# Patient Record
Sex: MALE | Race: WHITE
[De-identification: names, ages, dates, MRNs, and addresses within clinical notes are randomized per-mention and may not be internally consistent; named-entity substitution may affect disease eponyms.]

---

## 2020-09-24 ENCOUNTER — HOSPITAL ENCOUNTER (OUTPATIENT)
Dept: HOSPITAL 47 - ORWHC2ENDO | Age: 65
Discharge: HOME | End: 2020-09-24
Attending: SURGERY
Payer: MEDICARE

## 2020-09-24 VITALS — TEMPERATURE: 97.9 F

## 2020-09-24 VITALS — SYSTOLIC BLOOD PRESSURE: 134 MMHG | RESPIRATION RATE: 18 BRPM | HEART RATE: 53 BPM | DIASTOLIC BLOOD PRESSURE: 85 MMHG

## 2020-09-24 VITALS — BODY MASS INDEX: 27.7 KG/M2

## 2020-09-24 DIAGNOSIS — Z12.11: Primary | ICD-10-CM

## 2020-09-24 DIAGNOSIS — D17.5: ICD-10-CM

## 2020-09-24 DIAGNOSIS — Z79.899: ICD-10-CM

## 2020-09-24 DIAGNOSIS — K57.30: ICD-10-CM

## 2020-09-24 DIAGNOSIS — E78.5: ICD-10-CM

## 2020-09-24 DIAGNOSIS — N40.0: ICD-10-CM

## 2020-09-24 DIAGNOSIS — I10: ICD-10-CM

## 2020-09-24 DIAGNOSIS — Z98.890: ICD-10-CM

## 2020-09-24 DIAGNOSIS — Z86.010: ICD-10-CM

## 2020-09-24 PROCEDURE — 45385 COLONOSCOPY W/LESION REMOVAL: CPT

## 2020-09-24 PROCEDURE — 88305 TISSUE EXAM BY PATHOLOGIST: CPT

## 2020-09-24 NOTE — P.GSHP
History of Present Illness


H&P Date: 09/24/20


Chief Complaint: History of colon polyp





This a 65-year-old male presents today for colonoscopy.  Patient had a previous 

history of colon polyps.  His last colonoscopy was 6 years ago.





Past Medical History


Past Medical History: Hyperlipidemia, Hypertension, Prostate Disorder


Additional Past Medical History / Comment(s): Hx colon polyp x1.  BPH.


History of Any Multi-Drug Resistant Organisms: None Reported


Past Surgical History: Orthopedic Surgery, Tonsillectomy


Additional Past Surgical History / Comment(s): ORIF Lt foot.  Colonoscopies.  Rt

hand surg for laceration.


Past Anesthesia/Blood Transfusion Reactions: No Reported Reaction


Smoking Status: Never smoker





- Past Family History


  ** Mother


Family Medical History: No Reported History





Medications and Allergies


                                Home Medications











 Medication  Instructions  Recorded  Confirmed  Type


 


Atorvastatin [Lipitor] 20 mg PO DAILY 09/22/20 09/24/20 History


 


Ergocalciferol [Vitamin D2 50,000 unit PO Q7D 09/22/20 09/24/20 History





(DRISDOL)]    


 


Finasteride [Proscar] 5 mg PO DAILY 09/22/20 09/24/20 History


 


lisinopriL [Prinivil] 20 mg PO DAILY 09/22/20 09/24/20 History








                                    Allergies











Allergy/AdvReac Type Severity Reaction Status Date / Time


 


No Known Allergies Allergy   Verified 09/24/20 08:01














Surgical - Exam


                                   Vital Signs











Temp Pulse Resp BP Pulse Ox


 


 97.9 F   56 L  16   143/81   98 


 


 09/24/20 08:04  09/24/20 08:04  09/24/20 08:04  09/24/20 08:04  09/24/20 08:04














- General


well developed, well nourished, no distress





- Eyes


PERRL





- ENT


normal pinna





- Neck


no masses





- Respiratory


normal expansion





- Cardiovascular


Rhythm: regular





- Abdomen


Abdomen: soft, non tender





Assessment and Plan


Assessment: 





History: Polyps.  We'll perform colonoscopy.

## 2020-09-24 NOTE — P.OP
Date of Procedure: 09/24/20


Preoperative Diagnosis: 


History of colon polyp


Postoperative Diagnosis: 


Large peduncular polyp at 60 cm left colon


Procedure(s) Performed: 


Colonoscopy


Anesthesia: MAC


Surgeon: Tristen Woo


Pathology: other (Colon polyp)


Condition: stable


Disposition: PACU


Description of Procedure: 


The patient's placed on the endoscopy table in the lateral position.  He 

received IV sedation.  Digital rectal exam was performed which revealed no 

rebound.  The prostate was symmetric without nodules.  The flexible colonoscope 

was then placed patient anus passed throughout the entire colon.  The ileocecal 

valve was visualized.  Cecum, ascending and transverse colon appeared normal.  

In the descending colon at the 60 cm rikki there was a large peduncular polyp the

polyp was removed in several sections using the snare.  There is no bleeding 

seen from the polyp base.  Scope was withdrawn remainder the descending colon 

appeared normal.  In the; diffuse scattered diverticula.  Scope was then brought

back the rectum this appeared normal.  Scope was brought patient.

## 2022-02-13 ENCOUNTER — HOSPITAL ENCOUNTER (EMERGENCY)
Dept: HOSPITAL 47 - EC | Age: 67
Discharge: HOME | End: 2022-02-13
Payer: MEDICARE

## 2022-02-13 VITALS
TEMPERATURE: 97.4 F | SYSTOLIC BLOOD PRESSURE: 153 MMHG | DIASTOLIC BLOOD PRESSURE: 87 MMHG | HEART RATE: 68 BPM | RESPIRATION RATE: 20 BRPM

## 2022-02-13 DIAGNOSIS — I10: ICD-10-CM
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
## 2022-02-13 DIAGNOSIS — S32.402A: Primary | ICD-10-CM

## 2022-02-13 DIAGNOSIS — E78.5: ICD-10-CM

## 2022-02-13 PROCEDURE — 99284 EMERGENCY DEPT VISIT MOD MDM: CPT

## 2022-02-13 PROCEDURE — 96372 THER/PROPH/DIAG INJ SC/IM: CPT

## 2022-02-13 PROCEDURE — 73502 X-RAY EXAM HIP UNI 2-3 VIEWS: CPT

## 2022-02-13 PROCEDURE — 74176 CT ABD & PELVIS W/O CONTRAST: CPT

## 2022-02-13 NOTE — CT
EXAMINATION TYPE: CT abdomen pelvis wo con

 

DATE OF EXAM: 2/13/2022

 

COMPARISON: None

 

HISTORY: LT hip pain, pt fell on ice

 

CT DLP: 589.5 mGycm

Automated exposure control for dose reduction was used.

 

Images obtained from the diaphragm to the floor the pelvis without contrast.

 

Lung bases are clear. There is no pleural effusion. Heart size is normal. There is no pericardial eff
usion.

 

Liver spleen and stomach pancreas and gallbladder appear normal. The bile ducts are not dilated.

 

There is no adrenal mass. Kidneys have normal size. There is no hydronephrosis. The ureters are not d
ilated. There is no retroperitoneal adenopathy. Appendix appears normal. The bladder distends smoothl
y. There is no inguinal hernia. There is no free fluid in the pelvis.

 

There is no mesenteric edema. There is no ascites or free air. There is no bowel obstruction.

 

The lumbar vertebrae have normal alignment. There is narrowing at L5-S1 disc with anterior spur forma
tion. There is no compression fracture. There is hairline nondisplaced fracture through the medial le
ft acetabulum. IMPRESSION:

There is horizontal hairline fracture through the medial left acetabulum best seen on the coronal eamon
ges. No evidence of fracture of the proximal femur.

 

No acute abnormality within the abdomen and pelvis. Normal appendix.

## 2022-02-13 NOTE — XR
EXAMINATION TYPE: XR Hip Complete LT

 

DATE OF EXAM: 2/13/2022

 

COMPARISON: NONE

 

HISTORY: Fall. Pain

 

TECHNIQUE: 2 views

 

FINDINGS: I see no fracture nor dislocation. Hip joint space is fairly normal. Sacroiliac joint is in
tact.

 

IMPRESSION: Negative left hip exam. No fracture.

## 2022-02-13 NOTE — ED
General Adult HPI





- General


Chief complaint: Fall


Stated complaint: Fell on ice injured hip


Time Seen by Provider: 02/13/22 17:30


Source: patient, RN notes reviewed, old records reviewed


Mode of arrival: wheelchair


Limitations: no limitations





- History of Present Illness


Initial comments: 





Patient is a 66-year-old male with past medical history remarkable for 

hypertension who slipped and fell on ice at home.  This occurred a few hours 

ago.  He presents over concern for left hip pain that is isolated.  Denies any 

abdominal pain, chest pain, shortness of breath.  Did not hit his head.  No LOC.

 Is not on blood thinners.  No other injuries.  No other complaints.  Like to be

evaluated for left hip injury.  He states he is able to walk on it, however he 

is limping.  He has some difficulty with flexion.  Tenderness is over the left 

sided lateral hip.





- Related Data


                                Home Medications











 Medication  Instructions  Recorded  Confirmed


 


Atorvastatin [Lipitor] 20 mg PO DAILY 09/22/20 02/13/22


 


Ergocalciferol [Vitamin D2 50,000 unit PO Q7D 09/22/20 02/13/22





(DRISDOL)]   


 


Finasteride [Proscar] 5 mg PO DAILY 09/22/20 02/13/22


 


lisinopriL [Prinivil] 20 mg PO DAILY 09/22/20 02/13/22








                                  Previous Rx's











 Medication  Instructions  Recorded


 


HYDROcodone/APAP 5-325MG [Norco 5] 1 each PO Q6HR PRN 3 Days #12 tab 02/13/22


 


Methocarbamol [Robaxin-750] 750 mg PO BID 7 Days #14 tablet 02/13/22











                                    Allergies











Allergy/AdvReac Type Severity Reaction Status Date / Time


 


No Known Allergies Allergy   Verified 02/13/22 17:53














Review of Systems


ROS Statement: 


Those systems with pertinent positive or pertinent negative responses have been 

documented in the HPI.


Review of Systems:


CONST: Denies fever 


EYES: Denies blurry vision 


ENT: Denies nasal congestion  


C/V:  Denies Chest pain


RESP: Denies shortness of breath 


GI: Denies abdominal pain 


: Denies dysuria  


SKIN: Denies rash.


MSK: Endorses left hip pain.


NEURO: Denies headache 


ROS Other: All systems not noted in ROS Statement are negative.





Past Medical History


Past Medical History: Hyperlipidemia, Hypertension, Prostate Disorder


Additional Past Medical History / Comment(s): Hx colon polyp x1.  BPH.


History of Any Multi-Drug Resistant Organisms: None Reported


Past Surgical History: Orthopedic Surgery, Tonsillectomy


Additional Past Surgical History / Comment(s): ORIF Lt foot.  Colonoscopies.  Rt

hand surg for laceration.


Past Anesthesia/Blood Transfusion Reactions: No Reported Reaction


Past Psychological History: No Psychological Hx Reported


Smoking Status: Never smoker


Past Alcohol Use History: Occasional


Past Drug Use History: None Reported





- Past Family History


  ** Mother


Family Medical History: No Reported History





General Exam





- General Exam Comments


Initial Comments: 





General: Appears in no acute distress.


HEAD:  Normal with no signs of head trauma.


EYES:  PERRLA, EOMI, conjunctiva normal, no discharge.  Pupils are 3 mm and 

equal bilaterally.


ENT: Hearing grossly intact


RESPIRATORY: No Increased work of breathing.


C/V: Regular rate and rhythm.  Peripheral pulses 2+ and intact throughout.


ABD:  Abd is soft, nontender, nondistended


EXT: Reduced range of motion of left hip secondary to pain.  Pain is primarily 

over the left lateral femoral head.  Pain with flexion and extension.  No 

obvious deformity.  No pain distal to the left hip.  Pelvis is stable.


SKIN:  No rashes or lesions observed on exposed skin.


NEURO: Alert and oriented 4.


Limitations: no limitations





Course


                                   Vital Signs











  02/13/22





  17:12


 


Temperature 97.4 F L


 


Pulse Rate 68


 


Respiratory 20





Rate 


 


Blood Pressure 153/87


 


O2 Sat by Pulse 99





Oximetry 














Medical Decision Making





- Medical Decision Making





Based on the patient's presentation and physical exam, I'm concerned for 

possible bony tracking.  The patient's left hip.  He will be given analgesia 

with Norco as well as IM Toradol.  Pelvic x-ray was obtained prior to me 

evaluating the patient, as there was a delay placing him in the room from 

triage.  We discussed the results of his hip x-ray which revealed no acute 

fracture or dislocation.  Due to continued pain, I would like to obtain CT 

imaging of the left hip and pelvis to ensure that there is no acute fracture.  

He was in agreement this plan.





CT imaging revealed a hairline fracture of the left acetabulum.  No other 

injury.  I spoke with the patient, and he is able to ambulate, however it is 

painful.  I spoke with the physician assistant on-call, MUSA Abraham was in 

agreement with the plan for crutches and follow up tomorrow in clinic.  He will 

receive Dr. Angulo follow up information.  I instructed them to call the 

morning.  I discussed this with the patient and he was in agreement with this 

plan.  He'll be given a prescription for Norco 5, and a complete the opiate 

form.





I will provide the patient with a prescription for Norco 5, Robaxin. I 

instructed the patient to follow up with their PCP in the next 3 days. I 

provided contact information for follow up with orthopedic surgery, Kev. 

I explained that the patient should return to the emergency department if they 

experience any worsening symptoms. Strict return precautions were discussed with

the patient. The patient expressed understanding of these instructions. I 

answered all questions that the patient had. The patient was discharged home in 

fair condition with their prescriptions and follow up information.





Disposition


Clinical Impression: 


 Fall, Left acetabular fracture





Disposition: HOME SELF-CARE


Condition: Fair


Instructions (If sedation given, give patient instructions):  Crutch 

Instructions (ED), Fall Prevention (ED), Hip Fracture (ED)


Prescriptions: 


HYDROcodone/APAP 5-325MG [Norco 5] 1 each PO Q6HR PRN 3 Days #12 tab


 PRN Reason: Pain


Methocarbamol [Robaxin-750] 750 mg PO BID 7 Days #14 tablet


Is patient prescribed a controlled substance at d/c from ED?: No


When asked, does pt state using other controlled substances?: No


If prescribed controlled substance>3 days was MAPS reviewed?: Prescribed <3 Days


If opioid is for acute pain is fill amount 7 days or less?: Yes


If Rx opioid, was Start Talking consent form obtained?: Yes


Referrals: 


Celina Zurita DO [Primary Care Provider] - 1-2 days


[place] Angulo DO [Doctor of Osteopathic Medicine] - 1-2 days